# Patient Record
Sex: FEMALE | Race: WHITE
[De-identification: names, ages, dates, MRNs, and addresses within clinical notes are randomized per-mention and may not be internally consistent; named-entity substitution may affect disease eponyms.]

---

## 2023-12-25 ENCOUNTER — NON-APPOINTMENT (OUTPATIENT)
Age: 58
End: 2023-12-25

## 2024-01-10 ENCOUNTER — APPOINTMENT (OUTPATIENT)
Dept: HEART AND VASCULAR | Facility: CLINIC | Age: 59
End: 2024-01-10
Payer: COMMERCIAL

## 2024-01-10 ENCOUNTER — NON-APPOINTMENT (OUTPATIENT)
Age: 59
End: 2024-01-10

## 2024-01-10 VITALS
RESPIRATION RATE: 14 BRPM | OXYGEN SATURATION: 97 % | HEIGHT: 64 IN | DIASTOLIC BLOOD PRESSURE: 82 MMHG | HEART RATE: 72 BPM | WEIGHT: 181.6 LBS | BODY MASS INDEX: 31 KG/M2 | SYSTOLIC BLOOD PRESSURE: 124 MMHG

## 2024-01-10 VITALS — TEMPERATURE: 98.9 F

## 2024-01-10 DIAGNOSIS — Z78.9 OTHER SPECIFIED HEALTH STATUS: ICD-10-CM

## 2024-01-10 DIAGNOSIS — Z87.2 PERSONAL HISTORY OF DISEASES OF THE SKIN AND SUBCUTANEOUS TISSUE: ICD-10-CM

## 2024-01-10 DIAGNOSIS — R07.9 CHEST PAIN, UNSPECIFIED: ICD-10-CM

## 2024-01-10 DIAGNOSIS — Z82.49 FAMILY HISTORY OF ISCHEMIC HEART DISEASE AND OTHER DISEASES OF THE CIRCULATORY SYSTEM: ICD-10-CM

## 2024-01-10 DIAGNOSIS — Z87.891 PERSONAL HISTORY OF NICOTINE DEPENDENCE: ICD-10-CM

## 2024-01-10 DIAGNOSIS — Z86.39 PERSONAL HISTORY OF OTHER ENDOCRINE, NUTRITIONAL AND METABOLIC DISEASE: ICD-10-CM

## 2024-01-10 DIAGNOSIS — Z83.438 FAMILY HISTORY OF OTHER DISORDER OF LIPOPROTEIN METABOLISM AND OTHER LIPIDEMIA: ICD-10-CM

## 2024-01-10 PROBLEM — Z00.00 ENCOUNTER FOR PREVENTIVE HEALTH EXAMINATION: Status: ACTIVE | Noted: 2024-01-10

## 2024-01-10 PROCEDURE — 93000 ELECTROCARDIOGRAM COMPLETE: CPT

## 2024-01-10 PROCEDURE — 99204 OFFICE O/P NEW MOD 45 MIN: CPT | Mod: 25

## 2024-01-10 RX ORDER — MONTELUKAST SODIUM 10 MG/1
10 TABLET, FILM COATED ORAL
Refills: 0 | Status: ACTIVE | COMMUNITY

## 2024-01-10 RX ORDER — MULTIVITAMIN
TABLET ORAL
Refills: 0 | Status: ACTIVE | COMMUNITY

## 2024-01-10 RX ORDER — VALSARTAN 160 MG/1
160 TABLET, COATED ORAL EVERY MORNING
Refills: 0 | Status: ACTIVE | COMMUNITY

## 2024-01-11 RX ORDER — EVOLOCUMAB 140 MG/ML
140 INJECTION, SOLUTION SUBCUTANEOUS
Qty: 2 | Refills: 11 | Status: ACTIVE | COMMUNITY
Start: 2024-01-10 | End: 1900-01-01

## 2024-01-14 PROBLEM — R07.9 CHEST PAIN: Status: ACTIVE | Noted: 2024-01-14

## 2024-01-14 NOTE — ASSESSMENT
[FreeTextEntry1] : 57 y/o female with h/o HTN, HLD (?familial, LDL>200) here to establish care for elevated CAC score. +Significant FMH of premature CAD with recent episode of chest tightness #chest pain, dyspnea on exertion --given elevated CAC score and significant risk factors, will obtain CCTA to r/o obstructive CAD --TTE pending --continue crestor 40mg --ASA 81 if no contra-indication --continued risk factor modification #HLD - ?familial --Recent D-, Tchol >300 --will add PCSK-9Is in addition to max dose statin --heart healthy diet  Will follow up after above testing

## 2024-01-14 NOTE — HISTORY OF PRESENT ILLNESS
[FreeTextEntry1] : 57 y/o female with h/o HTN, HLD (?familial, LDL>200) here to establish care for elevated CAC score. Reports recent onset chest tightness, occurred about 6 weeks ago when she went on a strenuous hike that involved about 0.5 miles of stairs. When she got to the top, she noticed tightness/heaviness in her chest that resolved about 5 mins after resting. She noted that her HR was in the 150s at the time. This was also associated with dyspnea; denies any radiation of chest pain, nausea/ vomiting/ lightheadedness or other symptoms. she routinely uses the peloton about 3 times per week; however this never bothered her. The hike was more strenuous than her, and she exercised after a break denies any orthopnea/PND/LE edema, lightheadedness or dizziness.  PMH: HLD, psoriasis/psoriatic arthritis - stellara (no shot in 2 months), previously on otezla, HTN hysterectomy for adenomyolosis Significant FMH of ASCVD - mom AAA 60s, carotid 60s, MI in 70s, bypass 80s, father - MI early 60s passed away, brother MI at 62 Social Hx: former tobacco use (quit 30 years ago, moderate smoker), EtOH socially (weekend), coke in 80s, THC - edibles, no smoke  EKG today NSR at 74bpm, no ischemic changes

## 2024-01-14 NOTE — REASON FOR VISIT
[CV Risk Factors and Non-Cardiac Disease] : CV risk factors and non-cardiac disease [FreeTextEntry3] : Bernarda Bob NP [FreeTextEntry1] : establish cardiac care

## 2024-01-16 ENCOUNTER — NON-APPOINTMENT (OUTPATIENT)
Age: 59
End: 2024-01-16

## 2024-01-29 ENCOUNTER — NON-APPOINTMENT (OUTPATIENT)
Age: 59
End: 2024-01-29

## 2024-02-09 ENCOUNTER — TRANSCRIPTION ENCOUNTER (OUTPATIENT)
Age: 59
End: 2024-02-09

## 2024-02-09 ENCOUNTER — APPOINTMENT (OUTPATIENT)
Dept: HEART AND VASCULAR | Facility: CLINIC | Age: 59
End: 2024-02-09
Payer: COMMERCIAL

## 2024-02-09 VITALS
DIASTOLIC BLOOD PRESSURE: 68 MMHG | OXYGEN SATURATION: 97 % | HEART RATE: 73 BPM | BODY MASS INDEX: 30.05 KG/M2 | SYSTOLIC BLOOD PRESSURE: 108 MMHG | WEIGHT: 176 LBS | HEIGHT: 64 IN

## 2024-02-09 PROCEDURE — 93325 DOPPLER ECHO COLOR FLOW MAPG: CPT

## 2024-02-09 PROCEDURE — 93320 DOPPLER ECHO COMPLETE: CPT

## 2024-02-09 PROCEDURE — 93351 STRESS TTE COMPLETE: CPT

## 2024-02-21 ENCOUNTER — APPOINTMENT (OUTPATIENT)
Dept: HEART AND VASCULAR | Facility: CLINIC | Age: 59
End: 2024-02-21
Payer: COMMERCIAL

## 2024-02-21 ENCOUNTER — APPOINTMENT (OUTPATIENT)
Dept: HEART AND VASCULAR | Facility: CLINIC | Age: 59
End: 2024-02-21

## 2024-02-21 VITALS — SYSTOLIC BLOOD PRESSURE: 100 MMHG | DIASTOLIC BLOOD PRESSURE: 80 MMHG

## 2024-02-21 VITALS — SYSTOLIC BLOOD PRESSURE: 108 MMHG | DIASTOLIC BLOOD PRESSURE: 60 MMHG

## 2024-02-21 PROCEDURE — 99211 OFF/OP EST MAY X REQ PHY/QHP: CPT

## 2024-03-06 ENCOUNTER — APPOINTMENT (OUTPATIENT)
Dept: HEART AND VASCULAR | Facility: CLINIC | Age: 59
End: 2024-03-06
Payer: COMMERCIAL

## 2024-03-06 VITALS
SYSTOLIC BLOOD PRESSURE: 122 MMHG | RESPIRATION RATE: 16 BRPM | BODY MASS INDEX: 30.56 KG/M2 | WEIGHT: 179 LBS | TEMPERATURE: 97 F | OXYGEN SATURATION: 98 % | HEART RATE: 68 BPM | DIASTOLIC BLOOD PRESSURE: 70 MMHG | HEIGHT: 64 IN

## 2024-03-06 DIAGNOSIS — I10 ESSENTIAL (PRIMARY) HYPERTENSION: ICD-10-CM

## 2024-03-06 PROCEDURE — 99215 OFFICE O/P EST HI 40 MIN: CPT

## 2024-03-06 RX ORDER — METOPROLOL SUCCINATE 25 MG/1
25 TABLET, EXTENDED RELEASE ORAL
Qty: 2 | Refills: 0 | Status: DISCONTINUED | COMMUNITY
Start: 2024-01-10 | End: 2024-03-06

## 2024-03-06 RX ORDER — SERTRALINE HYDROCHLORIDE 25 MG/1
25 TABLET, FILM COATED ORAL DAILY
Refills: 0 | Status: ACTIVE | COMMUNITY

## 2024-03-17 NOTE — ASSESSMENT
[FreeTextEntry1] : 57 y/o female with h/o HTN, HLD (?familial, LDL>200) here for follow up. +Significant FMH of premature CAD with recent episode of chest tightness  #Bradycardia on her apple watch --States she historically has hx of lower heart rate --patient will let us know if she becomes symptomatic  #chest pain, dyspnea on exertion --given elevated CAC score and significant risk factors,  --CPET pending, stress echo WNL --CCTA if CPET is ischemic (denied by insurance) --continue pcsk-9 inh --continue ASA 81  --continued risk factor modification  #HLD  --Much improved LDL 70 --tolerating PCSK-9; continue --heart healthy diet  #HTN --decrease valsartan dose to 80mg daily --check BP twice daily, record readings --continue avoiding salt --increase PO hydration --BP check in 2 weeks  Will follow up in 6 months pending above workup

## 2024-03-17 NOTE — HISTORY OF PRESENT ILLNESS
[FreeTextEntry1] : 57 y/o female with h/o HTN, HLD (?familial, LDL>200) here for follow up after elevated CAC score and episode of chest pain while hiking. In the interim she underwent stress testing.  Since her initial visit: BP yesterday was 150/80's, states she had a stressful day at work. She is currently on Valsartan 160mg daily. States she has lost 7lbs, avoiding salt, increasing PO hydration and is exercising daily. She has also started zoloft and it has helped with her stress levels.  - Denies any recent cp, sob, black, orthopnea/PND/LE edema, lightheadedness or dizziness. - pending CPET - reports she sometimes experiences bradycardia when she is at rest - her apple watch alerts her to episodes of bradycardia, she is asymptomatic  PMH: HLD, psoriasis/psoriatic arthritis - stellara (no shot in 2 months), previously on otezla, HTN hysterectomy for adenomyolosis Significant FMH of ASCVD - mom AAA 60s, carotid 60s, MI in 70s, bypass 80s, father - MI early 60s passed away, brother MI at 62 Social Hx: former tobacco use (quit 30 years ago, moderate smoker), EtOH socially (weekend), coke in 80s, THC - edibles, no smoke  Cardiac workup: EKG: NSR, no significant ischemic/arrhythmic changes TTE: normal LV size/function. LVEF 67%, normal RV, asc aorta dilated 4.0cm. Trace MR/TR/IA EXSE normal stress echo; exercise time 9 min 20 sec (10.9 METs), 93% MPHR achieved. No EKG changes or echocardiographic changes c/w ischemia

## 2024-03-17 NOTE — REASON FOR VISIT
[CV Risk Factors and Non-Cardiac Disease] : CV risk factors and non-cardiac disease [FreeTextEntry3] : Bernarda Bob NP [FreeTextEntry1] : follow up cardiac care

## 2024-03-19 ENCOUNTER — APPOINTMENT (OUTPATIENT)
Dept: HEART AND VASCULAR | Facility: CLINIC | Age: 59
End: 2024-03-19

## 2024-03-25 ENCOUNTER — APPOINTMENT (OUTPATIENT)
Dept: HEART AND VASCULAR | Facility: CLINIC | Age: 59
End: 2024-03-25

## 2024-06-18 ENCOUNTER — RX RENEWAL (OUTPATIENT)
Age: 59
End: 2024-06-18

## 2024-06-18 RX ORDER — VALSARTAN 80 MG/1
80 TABLET, COATED ORAL DAILY
Qty: 90 | Refills: 3 | Status: ACTIVE | COMMUNITY
Start: 2024-03-06 | End: 1900-01-01

## 2024-08-04 ENCOUNTER — NON-APPOINTMENT (OUTPATIENT)
Age: 59
End: 2024-08-04

## 2024-09-09 ENCOUNTER — NON-APPOINTMENT (OUTPATIENT)
Age: 59
End: 2024-09-09

## 2024-09-09 ENCOUNTER — APPOINTMENT (OUTPATIENT)
Dept: HEART AND VASCULAR | Facility: CLINIC | Age: 59
End: 2024-09-09
Payer: COMMERCIAL

## 2024-09-09 VITALS
BODY MASS INDEX: 29.37 KG/M2 | HEIGHT: 64 IN | OXYGEN SATURATION: 98 % | HEART RATE: 68 BPM | RESPIRATION RATE: 14 BRPM | SYSTOLIC BLOOD PRESSURE: 110 MMHG | DIASTOLIC BLOOD PRESSURE: 74 MMHG | WEIGHT: 172 LBS

## 2024-09-09 PROCEDURE — 99214 OFFICE O/P EST MOD 30 MIN: CPT | Mod: 25

## 2024-09-09 PROCEDURE — 93000 ELECTROCARDIOGRAM COMPLETE: CPT

## 2024-09-09 RX ORDER — RANOLAZINE 500 MG/1
500 TABLET, EXTENDED RELEASE ORAL
Qty: 60 | Refills: 6 | Status: ACTIVE | COMMUNITY
Start: 2024-09-09 | End: 1900-01-01

## 2024-09-09 RX ORDER — ICOSAPENT ETHYL 1 G/1
1 CAPSULE ORAL
Qty: 360 | Refills: 3 | Status: ACTIVE | COMMUNITY
Start: 2024-09-09 | End: 1900-01-01

## 2024-09-10 LAB
ALBUMIN SERPL ELPH-MCNC: 4.8 G/DL
ALP BLD-CCNC: 77 U/L
ALT SERPL-CCNC: 17 U/L
ANION GAP SERPL CALC-SCNC: 12 MMOL/L
APPEARANCE: CLEAR
AST SERPL-CCNC: 16 U/L
BACTERIA: NEGATIVE /HPF
BASOPHILS # BLD AUTO: 0.04 K/UL
BASOPHILS NFR BLD AUTO: 0.6 %
BILIRUB SERPL-MCNC: 0.2 MG/DL
BILIRUBIN URINE: NEGATIVE
BLOOD URINE: NEGATIVE
BUN SERPL-MCNC: 13 MG/DL
CALCIUM SERPL-MCNC: 10.2 MG/DL
CAST: 1 /LPF
CHLORIDE SERPL-SCNC: 105 MMOL/L
CHOLEST SERPL-MCNC: 138 MG/DL
CK SERPL-CCNC: 97 U/L
CO2 SERPL-SCNC: 24 MMOL/L
COLOR: YELLOW
CREAT SERPL-MCNC: 0.88 MG/DL
EGFR: 77 ML/MIN/1.73M2
EOSINOPHIL # BLD AUTO: 0.23 K/UL
EOSINOPHIL NFR BLD AUTO: 3.5 %
EPITHELIAL CELLS: 0 /HPF
ESTIMATED AVERAGE GLUCOSE: 105 MG/DL
GLUCOSE QUALITATIVE U: NEGATIVE MG/DL
GLUCOSE SERPL-MCNC: 92 MG/DL
HBA1C MFR BLD HPLC: 5.3 %
HCT VFR BLD CALC: 43.9 %
HDLC SERPL-MCNC: 41 MG/DL
HGB BLD-MCNC: 12.9 G/DL
IMM GRANULOCYTES NFR BLD AUTO: 0.3 %
INR PPP: 0.97 RATIO
KETONES URINE: NEGATIVE MG/DL
LDLC SERPL CALC-MCNC: 65 MG/DL
LEUKOCYTE ESTERASE URINE: NEGATIVE
LYMPHOCYTES # BLD AUTO: 1.93 K/UL
LYMPHOCYTES NFR BLD AUTO: 29 %
MAN DIFF?: NORMAL
MCHC RBC-ENTMCNC: 29.4 GM/DL
MCHC RBC-ENTMCNC: 29.5 PG
MCV RBC AUTO: 100.5 FL
MICROSCOPIC-UA: NORMAL
MONOCYTES # BLD AUTO: 0.33 K/UL
MONOCYTES NFR BLD AUTO: 5 %
NEUTROPHILS # BLD AUTO: 4.11 K/UL
NEUTROPHILS NFR BLD AUTO: 61.6 %
NITRITE URINE: NEGATIVE
NONHDLC SERPL-MCNC: 98 MG/DL
PH URINE: 6
PLATELET # BLD AUTO: 382 K/UL
POTASSIUM SERPL-SCNC: 5.3 MMOL/L
PROT SERPL-MCNC: 7.2 G/DL
PROTEIN URINE: NEGATIVE MG/DL
PT BLD: 11 SEC
RBC # BLD: 4.37 M/UL
RBC # FLD: 14.4 %
RED BLOOD CELLS URINE: 1 /HPF
SODIUM SERPL-SCNC: 140 MMOL/L
SPECIFIC GRAVITY URINE: 1.01
TRIGL SERPL-MCNC: 200 MG/DL
UROBILINOGEN URINE: 0.2 MG/DL
WBC # FLD AUTO: 6.66 K/UL
WHITE BLOOD CELLS URINE: 0 /HPF

## 2024-09-12 NOTE — ASSESSMENT
[FreeTextEntry1] : 57 y/o female with h/o HTN, HLD (?familial, LDL>200) here for follow up cardiac care. Improved LDL on PCSK-9Is +Significant FMH of premature CAD with persistent chest tightness and dyspnea on strenuous exertion despite anti-anginal therapies.  #chest pain, dyspnea on exertion --given elevated CAC score and significant risk factors, will recommend cardiac cath to evaluate. --continue pcsk-9 inh --continue ASA 81  --continued risk factor modification  #HLD  --Much improved LDL 70 --tolerating PCSK-9; continue --heart healthy diet  #HTN; well controlled --decrease valsartan dose to 80mg daily --check BP twice daily, record readings --continue avoiding salt --increase PO hydration --BP check in 2 weeks  Will follow up post cath

## 2024-09-12 NOTE — HISTORY OF PRESENT ILLNESS
[FreeTextEntry1] : 59 y/o female with h/o HTN, HLD (?familial, LDL>200) here for follow up after elevated CAC score and episode of chest pain while hiking. She continues to have chest tightness and shortness of breath associated with palpitations every time she walks up steep hills. This occurs every time with exertion and improves with rest. She is okay with mild-moderate exertion. Active, losing weight; lost about 18 pounds since last visit. Denies any significant chest discomfort, palpitations, dyspnea, LE edema, PND or syncope at this time.  PMH: HLD, psoriasis/psoriatic arthritis - stellara (no shot in 2 months), previously on otezla, HTN hysterectomy for adenomyolosis Significant FMH of ASCVD - mom AAA 60s, carotid 60s, MI in 70s, bypass 80s, father - MI early 60s passed away, brother MI at 62 Social Hx: former tobacco use (quit 30 years ago, moderate smoker), EtOH socially (weekend), coke in 80s, THC - edibles, no smoke Current meds: repatha, losartan  Cardiac workup: CAC score 144 moderate EKG: NSR, no significant ischemic/arrhythmic changes TTE 02/2024: normal LV size/function. LVEF 67%, normal RV, asc aorta dilated 4.0cm. Trace MR/TR/OH EXSE 02/2024 normal stress echo; exercise time 9 min 20 sec (10.9 METs), 93% MPHR achieved. No EKG changes or echocardiographic changes c/w ischemia

## 2024-09-12 NOTE — HISTORY OF PRESENT ILLNESS
[FreeTextEntry1] : 59 y/o female with h/o HTN, HLD (?familial, LDL>200) here for follow up after elevated CAC score and episode of chest pain while hiking. She continues to have chest tightness and shortness of breath associated with palpitations every time she walks up steep hills. This occurs every time with exertion and improves with rest. She is okay with mild-moderate exertion. Active, losing weight; lost about 18 pounds since last visit. Denies any significant chest discomfort, palpitations, dyspnea, LE edema, PND or syncope at this time.  PMH: HLD, psoriasis/psoriatic arthritis - stellara (no shot in 2 months), previously on otezla, HTN hysterectomy for adenomyolosis Significant FMH of ASCVD - mom AAA 60s, carotid 60s, MI in 70s, bypass 80s, father - MI early 60s passed away, brother MI at 62 Social Hx: former tobacco use (quit 30 years ago, moderate smoker), EtOH socially (weekend), coke in 80s, THC - edibles, no smoke Current meds: repatha, losartan  Cardiac workup: CAC score 144 moderate EKG: NSR, no significant ischemic/arrhythmic changes TTE 02/2024: normal LV size/function. LVEF 67%, normal RV, asc aorta dilated 4.0cm. Trace MR/TR/WV EXSE 02/2024 normal stress echo; exercise time 9 min 20 sec (10.9 METs), 93% MPHR achieved. No EKG changes or echocardiographic changes c/w ischemia

## 2024-09-12 NOTE — ASSESSMENT
[FreeTextEntry1] : 59 y/o female with h/o HTN, HLD (?familial, LDL>200) here for follow up cardiac care. Improved LDL on PCSK-9Is +Significant FMH of premature CAD with persistent chest tightness and dyspnea on strenuous exertion despite anti-anginal therapies.  #chest pain, dyspnea on exertion --given elevated CAC score and significant risk factors, will recommend cardiac cath to evaluate. --continue pcsk-9 inh --continue ASA 81  --continued risk factor modification  #HLD  --Much improved LDL 70 --tolerating PCSK-9; continue --heart healthy diet  #HTN; well controlled --decrease valsartan dose to 80mg daily --check BP twice daily, record readings --continue avoiding salt --increase PO hydration --BP check in 2 weeks  Will follow up post cath

## 2024-09-12 NOTE — HISTORY OF PRESENT ILLNESS
[FreeTextEntry1] : 57 y/o female with h/o HTN, HLD (?familial, LDL>200) here for follow up after elevated CAC score and episode of chest pain while hiking. She continues to have chest tightness and shortness of breath associated with palpitations every time she walks up steep hills. This occurs every time with exertion and improves with rest. She is okay with mild-moderate exertion. Active, losing weight; lost about 18 pounds since last visit. Denies any significant chest discomfort, palpitations, dyspnea, LE edema, PND or syncope at this time.  PMH: HLD, psoriasis/psoriatic arthritis - stellara (no shot in 2 months), previously on otezla, HTN hysterectomy for adenomyolosis Significant FMH of ASCVD - mom AAA 60s, carotid 60s, MI in 70s, bypass 80s, father - MI early 60s passed away, brother MI at 62 Social Hx: former tobacco use (quit 30 years ago, moderate smoker), EtOH socially (weekend), coke in 80s, THC - edibles, no smoke Current meds: repatha, losartan  Cardiac workup: CAC score 144 moderate EKG: NSR, no significant ischemic/arrhythmic changes TTE 02/2024: normal LV size/function. LVEF 67%, normal RV, asc aorta dilated 4.0cm. Trace MR/TR/WA EXSE 02/2024 normal stress echo; exercise time 9 min 20 sec (10.9 METs), 93% MPHR achieved. No EKG changes or echocardiographic changes c/w ischemia

## 2024-09-30 ENCOUNTER — APPOINTMENT (OUTPATIENT)
Dept: HEART AND VASCULAR | Facility: CLINIC | Age: 59
End: 2024-09-30

## 2024-09-30 ENCOUNTER — NON-APPOINTMENT (OUTPATIENT)
Age: 59
End: 2024-09-30

## 2024-09-30 VITALS
RESPIRATION RATE: 16 BRPM | DIASTOLIC BLOOD PRESSURE: 80 MMHG | HEIGHT: 64 IN | BODY MASS INDEX: 28.68 KG/M2 | WEIGHT: 168 LBS | SYSTOLIC BLOOD PRESSURE: 118 MMHG | HEART RATE: 60 BPM | OXYGEN SATURATION: 98 %

## 2024-09-30 PROCEDURE — 99214 OFFICE O/P EST MOD 30 MIN: CPT

## 2024-10-03 RX ORDER — AMLODIPINE BESYLATE 2.5 MG/1
2.5 TABLET ORAL
Qty: 90 | Refills: 3 | Status: ACTIVE | COMMUNITY
Start: 2024-09-30 | End: 1900-01-01

## 2024-10-08 RX ORDER — ICOSAPENT ETHYL 1 G/1
1 CAPSULE ORAL
Refills: 0 | Status: ACTIVE | COMMUNITY

## 2024-12-11 ENCOUNTER — RX RENEWAL (OUTPATIENT)
Age: 59
End: 2024-12-11

## 2025-01-02 ENCOUNTER — APPOINTMENT (OUTPATIENT)
Dept: GASTROENTEROLOGY | Facility: CLINIC | Age: 60
End: 2025-01-02
Payer: COMMERCIAL

## 2025-01-02 VITALS — OXYGEN SATURATION: 98 % | SYSTOLIC BLOOD PRESSURE: 108 MMHG | HEART RATE: 76 BPM | DIASTOLIC BLOOD PRESSURE: 76 MMHG

## 2025-01-02 DIAGNOSIS — K21.9 GASTRO-ESOPHAGEAL REFLUX DISEASE W/OUT ESOPHAGITIS: ICD-10-CM

## 2025-01-02 DIAGNOSIS — Z12.11 ENCOUNTER FOR SCREENING FOR MALIGNANT NEOPLASM OF COLON: ICD-10-CM

## 2025-01-02 DIAGNOSIS — Z80.0 ENCOUNTER FOR SCREENING FOR MALIGNANT NEOPLASM OF COLON: ICD-10-CM

## 2025-01-02 PROCEDURE — 99203 OFFICE O/P NEW LOW 30 MIN: CPT
